# Patient Record
Sex: FEMALE | Race: WHITE | Employment: FULL TIME | URBAN - METROPOLITAN AREA
[De-identification: names, ages, dates, MRNs, and addresses within clinical notes are randomized per-mention and may not be internally consistent; named-entity substitution may affect disease eponyms.]

---

## 2019-09-05 LAB
CANDIDA SPECIES, DNA PROBE: NORMAL
GARDNERELLA VAGINALIS, DNA PROBE: NORMAL
HPV COMMENT: NORMAL
HPV TYPE 16: NOT DETECTED
HPV TYPE 18: NOT DETECTED
HPVOH (OTHER TYPES): NOT DETECTED
TRICHOMONAS VAGINALIS DNA: NORMAL

## 2020-09-03 ENCOUNTER — HOSPITAL ENCOUNTER (EMERGENCY)
Age: 30
Discharge: HOME | End: 2020-09-03
Payer: COMMERCIAL

## 2020-09-03 VITALS
DIASTOLIC BLOOD PRESSURE: 87 MMHG | HEART RATE: 67 BPM | TEMPERATURE: 98 F | OXYGEN SATURATION: 100 % | RESPIRATION RATE: 18 BRPM | SYSTOLIC BLOOD PRESSURE: 134 MMHG | WEIGHT: 168 LBS | BODY MASS INDEX: 25.54 KG/M2

## 2020-09-03 LAB
BACTERIA WET PREP: NORMAL
BILIRUBIN URINE: NEGATIVE
BLOOD, URINE: NEGATIVE
CLARITY: CLEAR
CLUE CELLS: NORMAL
COLOR: YELLOW
EPITHELIAL CELLS WET PREP: NORMAL
GLUCOSE URINE: NEGATIVE MG/DL
HCG(URINE) PREGNANCY TEST: NEGATIVE
KETONES, URINE: NEGATIVE MG/DL
LEUKOCYTE ESTERASE, URINE: NEGATIVE
MICROSCOPIC EXAMINATION: NORMAL
NITRITE, URINE: NEGATIVE
PH UA: 7 (ref 5–8)
PROTEIN UA: NEGATIVE MG/DL
RBC WET PREP: NORMAL
SOURCE WET PREP: NORMAL
SPECIFIC GRAVITY UA: 1.01 (ref 1–1.03)
TRICHOMONAS PREP: NORMAL
URINE REFLEX TO CULTURE: NORMAL
URINE TYPE: NORMAL
UROBILINOGEN, URINE: 0.2 E.U./DL
WBC WET PREP: NORMAL
YEAST WET PREP: NORMAL

## 2020-09-03 PROCEDURE — 99283 EMERGENCY DEPT VISIT LOW MDM: CPT

## 2020-09-03 PROCEDURE — 81003 URINALYSIS AUTO W/O SCOPE: CPT

## 2020-09-03 PROCEDURE — 87491 CHLMYD TRACH DNA AMP PROBE: CPT

## 2020-09-03 PROCEDURE — 87591 N.GONORRHOEAE DNA AMP PROB: CPT

## 2020-09-03 PROCEDURE — 6360000002 HC RX W HCPCS: Performed by: PHYSICIAN ASSISTANT

## 2020-09-03 PROCEDURE — 84703 CHORIONIC GONADOTROPIN ASSAY: CPT

## 2020-09-03 PROCEDURE — 96372 THER/PROPH/DIAG INJ SC/IM: CPT

## 2020-09-03 PROCEDURE — 87210 SMEAR WET MOUNT SALINE/INK: CPT

## 2020-09-03 PROCEDURE — 6370000000 HC RX 637 (ALT 250 FOR IP): Performed by: PHYSICIAN ASSISTANT

## 2020-09-03 PROCEDURE — 2580000003 HC RX 258

## 2020-09-03 RX ORDER — FLUCONAZOLE 150 MG/1
150 TABLET ORAL ONCE
Qty: 1 TABLET | Refills: 0 | Status: SHIPPED | OUTPATIENT
Start: 2020-09-03 | End: 2020-09-03

## 2020-09-03 RX ORDER — FLUCONAZOLE 100 MG/1
200 TABLET ORAL ONCE
Status: COMPLETED | OUTPATIENT
Start: 2020-09-03 | End: 2020-09-03

## 2020-09-03 RX ORDER — CEFTRIAXONE SODIUM 250 MG/1
250 INJECTION, POWDER, FOR SOLUTION INTRAMUSCULAR; INTRAVENOUS ONCE
Status: COMPLETED | OUTPATIENT
Start: 2020-09-03 | End: 2020-09-03

## 2020-09-03 RX ORDER — AZITHROMYCIN 250 MG/1
1000 TABLET, FILM COATED ORAL ONCE
Status: COMPLETED | OUTPATIENT
Start: 2020-09-03 | End: 2020-09-03

## 2020-09-03 RX ADMIN — WATER 10 ML: 1 INJECTION INTRAMUSCULAR; INTRAVENOUS; SUBCUTANEOUS at 18:12

## 2020-09-03 RX ADMIN — CEFTRIAXONE SODIUM 250 MG: 250 INJECTION, POWDER, FOR SOLUTION INTRAMUSCULAR; INTRAVENOUS at 18:12

## 2020-09-03 RX ADMIN — FLUCONAZOLE 200 MG: 100 TABLET ORAL at 18:11

## 2020-09-03 RX ADMIN — AZITHROMYCIN MONOHYDRATE 1000 MG: 250 TABLET ORAL at 18:11

## 2020-09-03 ASSESSMENT — ENCOUNTER SYMPTOMS
BACK PAIN: 0
ABDOMINAL DISTENTION: 0
ABDOMINAL PAIN: 0
WHEEZING: 0
DIARRHEA: 0
SHORTNESS OF BREATH: 0
COUGH: 0
COLOR CHANGE: 0
STRIDOR: 0
VOMITING: 0
CONSTIPATION: 0
NAUSEA: 0

## 2020-09-03 ASSESSMENT — PAIN SCALES - GENERAL: PAINLEVEL_OUTOF10: 4

## 2020-09-03 ASSESSMENT — PAIN DESCRIPTION - LOCATION: LOCATION: PELVIS

## 2020-09-03 NOTE — ED PROVIDER NOTES
[facility]        Pt Name: Jose Alfredo Hawk  MRN: [id number]  [place] 1990  Date of evaluation: 9/3/2020  Provider: Umair Worrell PA-C  PCP: No primary care provider on file. LIVIA. I have evaluated this patient. My supervising physician was available for consultation. CHIEF COMPLAINT       Chief Complaint   Patient presents with    Vaginal Discharge     Pt c/o vaginal irriation. Clear vaginal discharge Reports recently being treated for UTI and yeast infection. HISTORY OF PRESENT ILLNESS   (Location, Timing/Onset, Context/Setting, Quality, Duration, Modifying Factors, Severity, Associated Signs and Symptoms)  Note limiting factors. Jose Alfredo Hawk is a 30 y.o. female who presents to the emergency department with vaginal irritation and discharge for little over 1 week. She just completed a course of Macrobid to treat UTI. After completing the course of Macrobid, she developed a yeast infection and was placed on Diflucan. She did recently have sexual intercourse with male partner without using protection and would like to be tested and treated for STD. Nursing Notes were all reviewed and agreed with or any disagreements were addressed in the HPI. REVIEW OF SYSTEMS    (2-9 systems for level 4, 10 or more for level 5)     Review of Systems   Constitutional: Negative for chills and fever. HENT: Negative. Eyes: Negative for visual disturbance. Respiratory: Negative for cough, shortness of breath, wheezing and stridor. Cardiovascular: Negative for chest pain, palpitations and leg swelling. Gastrointestinal: Negative for abdominal distention, abdominal pain, constipation, diarrhea, nausea and vomiting. Endocrine: Negative for polydipsia, polyphagia and polyuria. Genitourinary: Positive for vaginal discharge and vaginal pain.  Negative for decreased urine volume, difficulty urinating, dysuria, flank pain, frequency, hematuria, pelvic pain, urgency and vaginal bleeding. Musculoskeletal: Negative for back pain, neck pain and neck stiffness. Skin: Negative for color change, pallor, rash and wound. Neurological: Negative for dizziness, tremors, seizures, syncope, facial asymmetry, speech difficulty, weakness, light-headedness, numbness and headaches. Psychiatric/Behavioral: Negative for confusion. All other systems reviewed and are negative. Positives and Pertinent negatives as per HPI. Except as noted above in the ROS, all other systems were reviewed and negative. PAST MEDICAL HISTORY   History reviewed. No pertinent past medical history. SURGICAL HISTORY     Past Surgical History:   Procedure Laterality Date    BREAST SURGERY           CURRENTMEDICATIONS       Previous Medications    ACETAMINOPHEN (TYLENOL) 500 MG TABLET    Take 1,000 mg by mouth every 6 hours as needed. DOCOSAHEXAENOIC ACID (DHA COMPLETE PO)    Take  by mouth daily. PRENATAL VITAMIN (PRENATAL-S) 27-0.8 MG TABS    Take 1 tablet by mouth daily. ALLERGIES     Ciprofloxacin    FAMILYHISTORY       Family History   Problem Relation Age of Onset    High Cholesterol Maternal Grandmother     High Blood Pressure Maternal Grandmother     Heart Disease Maternal Grandfather     Diabetes Paternal Grandfather           SOCIAL HISTORY       Social History     Tobacco Use    Smoking status: Never Smoker    Smokeless tobacco: Never Used   Substance Use Topics    Alcohol use: No    Drug use: Yes     Types: Marijuana       SCREENINGS             PHYSICAL EXAM    (up to 7 for level 4, 8 or more for level 5)     ED Triage Vitals [09/03/20 1656]   BP Temp Temp Source Pulse Resp SpO2 Height Weight   134/87 98 °F (36.7 °C) Temporal 67 18 100 % -- 168 lb (76.2 kg)       Physical Exam  Vitals signs and nursing note reviewed. Constitutional:       Appearance: Normal appearance. She is well-developed. She is not toxic-appearing or diaphoretic. HENT:      Head: Normocephalic and atraumatic. Right Ear: External ear normal.      Left Ear: External ear normal.      Nose: Nose normal.      Mouth/Throat:      Mouth: Mucous membranes are moist.      Pharynx: Oropharynx is clear. Eyes:      General: No scleral icterus. Right eye: No discharge. Left eye: No discharge. Extraocular Movements: Extraocular movements intact. Conjunctiva/sclera: Conjunctivae normal.      Pupils: Pupils are equal, round, and reactive to light. Neck:      Musculoskeletal: Normal range of motion. Cardiovascular:      Rate and Rhythm: Normal rate. Pulmonary:      Effort: Pulmonary effort is normal.      Breath sounds: Normal breath sounds. Abdominal:      General: Bowel sounds are normal. There is no distension. Palpations: Abdomen is soft. Tenderness: There is no abdominal tenderness. There is no right CVA tenderness or left CVA tenderness. Hernia: A hernia is present. There is no hernia in the left inguinal area or right inguinal area. Genitourinary:     General: Normal vulva. Exam position: Supine. Pubic Area: No rash or pubic lice. Labia:         Right: No rash, tenderness, lesion or injury. Left: No rash, tenderness, lesion or injury. Urethra: No prolapse or urethral pain. Vagina: No signs of injury and foreign body. Vaginal discharge (trace amount ) present. No erythema, tenderness, bleeding, lesions or prolapsed vaginal walls. Musculoskeletal: Normal range of motion. Lymphadenopathy:      Lower Body: No right inguinal adenopathy. No left inguinal adenopathy. Skin:     General: Skin is warm and dry. Capillary Refill: Capillary refill takes less than 2 seconds. Coloration: Skin is not jaundiced or pale. Findings: No bruising, erythema, lesion or rash. Neurological:      General: No focal deficit present.       Mental Status: She water injection (has no administration in time range)         This patient presents complaining of vaginal irritation and discharge. There are no focal lesions present. No evidence of other rash, edema, erythema. Abdomen is soft and nontender in all 4 quadrants without pulsatile mass or CVA tenderness. Wet prep is unremarkable. Urinalysis does not suggest infection. Pregnancy test is negative. She wants to be treated for STD. She tolerated medications here well without immediate complications or emesis. Will be sent home with Diflucan since she is prone to getting yeast infections after antibiotics are consumed. She is advised to follow-up closely with gynecologist for recheck and may return to ED per discharge instructions. She was counseled on safe sex release 3 minutes. My suspicion is low for acute surgical abdomen, obstruction, perforation, abscess, mesenteric ischemia, AAA, dissection, cholecystitis, cholangitis, pancreatitis, appendicitis, C. diff colitis, diverticulitis, volvulus, incarcerated hernia, necrotizing fasciitis, TOA, ovarian torsion, PID, ectopic pregnancy, frank mary Greyson syndrome, intussusception, syphilis, herpes, HPV, incarcerated hernia, Kelli gangrene, pyelonephritis, perinephric abscess, kidney stone, urosepsis, fistula or other concerning pathology. We have addressed concerns and expectations . FINAL IMPRESSION      1. Vaginal discharge    2.  Acute vaginitis          DISPOSITION/PLAN   DISPOSITION Decision To Discharge 09/03/2020 06:06:22 PM      PATIENT REFERREDTO:  ROSA Frausto [address]  [address] [place] [address]  [phone]    or see your gynecologist in 1-3 days      DISCHARGE MEDICATIONS:  New Prescriptions    FLUCONAZOLE (DIFLUCAN) 150 MG TABLET    Take 1 tablet by mouth once for 1 dose Take in one week       DISCONTINUED MEDICATIONS:  Discontinued Medications    No medications on file              (Please note that portions of this note were completed with a voice recognition program.  Efforts were made to edit the dictations but occasionally words are mis-transcribed.)    Silvio Schmid PA-C (electronically signed)           Silvio Schmid PA-C  09/03/20 1713

## 2020-09-03 NOTE — ED NOTES
Pt discharged in stable condition, VSS, no signs of distress. Discharge instructions and meds reviewed. Pt verbalizes understanding and states no further questions or concerns unaddressed.        Teodoro Mcadams RN  09/03/20 6718

## 2020-09-03 NOTE — ED NOTES
Bed: 03  Expected date:   Expected time:   Means of arrival:   Comments:  Álvaro Corona 39, RN  09/03/20 6102

## 2020-09-08 LAB
C TRACH DNA GENITAL QL NAA+PROBE: NEGATIVE
N. GONORRHOEAE DNA: NEGATIVE

## 2021-07-15 ENCOUNTER — HOSPITAL ENCOUNTER (EMERGENCY)
Age: 31
Discharge: LEFT BEFORE DISCHARGE | End: 2021-07-15
Payer: COMMERCIAL

## 2021-07-16 ENCOUNTER — HOSPITAL ENCOUNTER (EMERGENCY)
Age: 31
Discharge: HOME | End: 2021-07-16
Payer: COMMERCIAL

## 2021-07-16 ENCOUNTER — APPOINTMENT (OUTPATIENT)
Dept: GENERAL RADIOLOGY | Age: 31
End: 2021-07-16
Payer: COMMERCIAL

## 2021-07-16 VITALS
WEIGHT: 168 LBS | DIASTOLIC BLOOD PRESSURE: 73 MMHG | HEART RATE: 85 BPM | SYSTOLIC BLOOD PRESSURE: 120 MMHG | TEMPERATURE: 98.7 F | HEIGHT: 68 IN | RESPIRATION RATE: 14 BRPM | BODY MASS INDEX: 25.46 KG/M2 | OXYGEN SATURATION: 97 %

## 2021-07-16 DIAGNOSIS — A49.9 BACTERIAL URINARY TRACT INFECTION: ICD-10-CM

## 2021-07-16 DIAGNOSIS — N76.0 BACTERIAL VAGINOSIS: Primary | ICD-10-CM

## 2021-07-16 DIAGNOSIS — N39.0 BACTERIAL URINARY TRACT INFECTION: ICD-10-CM

## 2021-07-16 DIAGNOSIS — J06.9 UPPER RESPIRATORY INFECTION WITH COUGH AND CONGESTION: ICD-10-CM

## 2021-07-16 DIAGNOSIS — B96.89 BACTERIAL VAGINOSIS: Primary | ICD-10-CM

## 2021-07-16 LAB
BACTERIA WET PREP: ABNORMAL
BACTERIA: ABNORMAL /HPF
BILIRUBIN URINE: NEGATIVE
BLOOD, URINE: NEGATIVE
CLARITY: ABNORMAL
CLUE CELLS: ABNORMAL
COLOR: YELLOW
EPITHELIAL CELLS WET PREP: ABNORMAL
EPITHELIAL CELLS, UA: 19 /HPF (ref 0–5)
GLUCOSE URINE: NEGATIVE MG/DL
HYALINE CASTS: 1 /LPF (ref 0–8)
KETONES, URINE: NEGATIVE MG/DL
LEUKOCYTE ESTERASE, URINE: ABNORMAL
MICROSCOPIC EXAMINATION: YES
NITRITE, URINE: NEGATIVE
PH UA: 6.5 (ref 5–8)
PROTEIN UA: NEGATIVE MG/DL
RBC UA: 2 /HPF (ref 0–4)
RBC WET PREP: ABNORMAL
SOURCE WET PREP: ABNORMAL
SPECIFIC GRAVITY UA: 1.02 (ref 1–1.03)
TRICHOMONAS PREP: ABNORMAL
URINE REFLEX TO CULTURE: ABNORMAL
URINE TYPE: ABNORMAL
UROBILINOGEN, URINE: 0.2 E.U./DL
WBC UA: 8 /HPF (ref 0–5)
WBC WET PREP: ABNORMAL
YEAST WET PREP: ABNORMAL

## 2021-07-16 PROCEDURE — 87591 N.GONORRHOEAE DNA AMP PROB: CPT

## 2021-07-16 PROCEDURE — 86695 HERPES SIMPLEX TYPE 1 TEST: CPT

## 2021-07-16 PROCEDURE — 99284 EMERGENCY DEPT VISIT MOD MDM: CPT

## 2021-07-16 PROCEDURE — 86694 HERPES SIMPLEX NES ANTBDY: CPT

## 2021-07-16 PROCEDURE — 86696 HERPES SIMPLEX TYPE 2 TEST: CPT

## 2021-07-16 PROCEDURE — 71046 X-RAY EXAM CHEST 2 VIEWS: CPT

## 2021-07-16 PROCEDURE — 87210 SMEAR WET MOUNT SALINE/INK: CPT

## 2021-07-16 PROCEDURE — 87491 CHLMYD TRACH DNA AMP PROBE: CPT

## 2021-07-16 PROCEDURE — 81001 URINALYSIS AUTO W/SCOPE: CPT

## 2021-07-16 RX ORDER — METRONIDAZOLE 500 MG/1
500 TABLET ORAL 2 TIMES DAILY
Qty: 14 TABLET | Refills: 0 | Status: SHIPPED | OUTPATIENT
Start: 2021-07-16 | End: 2021-07-23

## 2021-07-16 RX ORDER — CEPHALEXIN 500 MG/1
500 CAPSULE ORAL 2 TIMES DAILY
Qty: 10 CAPSULE | Refills: 0 | Status: SHIPPED | OUTPATIENT
Start: 2021-07-16 | End: 2021-07-21

## 2021-07-16 ASSESSMENT — ENCOUNTER SYMPTOMS
SHORTNESS OF BREATH: 1
VOMITING: 0
ABDOMINAL PAIN: 0
CHEST TIGHTNESS: 0
NAUSEA: 0
COUGH: 1
DIARRHEA: 0

## 2021-07-16 NOTE — ED PROVIDER NOTES
[facility]        Pt Name: Karolyn Graff  MRN: [id number]  [place] 1990  Date of evaluation: 7/16/2021  Provider: Kevin Jean PA-C  PCP: No primary care provider on file. Note Started: 9:27 AM EDT       LIVIA. I have evaluated this patient. My supervising physician was available for consultation. CHIEF COMPLAINT       Chief Complaint   Patient presents with    Exposure to STD     Pt to ER for complaint of \"pimples\" in groin, was seen and treated prophylactically for STDs with doxycycline and flagyl, all tests had come back negative.  Congestion     Pt also complains of URI symptoms a little over 1 week, including congestion, cough, denies SOB, CP, fevers. Covid negative 4 days ago. HISTORY OF PRESENT ILLNESS   (Location, Timing/Onset, Context/Setting, Quality, Duration, Modifying Factors, Severity, Associated Signs and Symptoms)  Note limiting factors. Chief Complaint: Possible STD as well as congestion    Karolyn Graff is a 26 y.o. female who presents to the emergency department following a visit that she had recently at the urgent care. Patient had concerns for the possibility of STD. She states that she had had treatment approximately 3 and half weeks ago for yeast vaginitis. She states she took Diflucan in the form of pills. She was still having vaginal discharge and vaginal complaints. She went back to the urgent care because of this and states that she had gonorrhea chlamydia trichomonas and wet prep reperformed. She states that she was offered prophylaxis and did get Rocephin as well as doxycycline and finished its course. She states despite that she is still having pain and occasional vaginal discharge. Patient states she did receive all of the test results thereafter and they were all noted to be negative.   She states she has what she had described as a ulcerated lesion on the left side of her labia a little bit more than a week ago and she is worried to the possibility that this could have been herpes. Patient states the lesion has since gone away. She has no additional lesions and concerns and wants to be tested for herpes. She also goes on to report that she is having upper respiratory symptoms for the past 7 days. She has had congestion as well as mild cough. She did have an evaluation performed and is Covid negative just 4 days ago. Because she is having persistent symptoms related to upper respiratory nasal congestion chest congestion and mild cough she would like this evaluated today as well while she is here in the emergency department. No additional complaints or concerns otherwise noted. Nursing Notes were all reviewed and agreed with or any disagreements were addressed in the HPI. REVIEW OF SYSTEMS    (2-9 systems for level 4, 10 or more for level 5)     Review of Systems   Constitutional: Positive for fever. Negative for activity change and chills. HENT: Positive for congestion. Respiratory: Positive for cough and shortness of breath. Negative for chest tightness. Cardiovascular: Negative for chest pain. Gastrointestinal: Negative for abdominal pain, diarrhea, nausea and vomiting. Genitourinary: Positive for vaginal discharge and vaginal pain. Negative for decreased urine volume, dysuria and flank pain. Skin: Positive for wound. Positives and Pertinent negatives as per HPI. Except as noted above in the ROS, all other systems were reviewed and negative. PAST MEDICAL HISTORY   History reviewed. No pertinent past medical history. SURGICAL HISTORY     Past Surgical History:   Procedure Laterality Date    BREAST SURGERY           CURRENTMEDICATIONS       Previous Medications    ACETAMINOPHEN (TYLENOL) 500 MG TABLET    Take 1,000 mg by mouth every 6 hours as needed. DOCOSAHEXAENOIC ACID (DHA COMPLETE PO)    Take  by mouth daily.       PRENATAL VITAMIN (PRENATAL-S) 27-0.8 MG TABS    Take 1 tablet by mouth daily. ALLERGIES     Ciprofloxacin    FAMILYHISTORY       Family History   Problem Relation Age of Onset    High Cholesterol Maternal Grandmother     High Blood Pressure Maternal Grandmother     Heart Disease Maternal Grandfather     Diabetes Paternal Grandfather           SOCIAL HISTORY       Social History     Tobacco Use    Smoking status: Never Smoker    Smokeless tobacco: Never Used   Substance Use Topics    Alcohol use: Yes     Comment: Socially    Drug use: Yes     Types: Marijuana       SCREENINGS             PHYSICAL EXAM    (up to 7 for level 4, 8 or more for level 5)     ED Triage Vitals [07/16/21 0909]   BP Temp Temp Source Pulse Resp SpO2 Height Weight   120/73 98.7 °F (37.1 °C) Oral 86 18 100 % 5' 7.5\" (1.715 m) 168 lb (76.2 kg)       Physical Exam  Vitals and nursing note reviewed. Constitutional:       General: She is awake. She is not in acute distress. Appearance: Normal appearance. She is well-developed. She is not diaphoretic. HENT:      Head: Normocephalic and atraumatic. No raccoon eyes, Barron's sign, contusion or laceration. Right Ear: Hearing and external ear normal.      Left Ear: Hearing and external ear normal.      Nose: Nose normal. No rhinorrhea. Eyes:      General: No scleral icterus. Right eye: No discharge. Left eye: No discharge. Conjunctiva/sclera: Conjunctivae normal.   Neck:      Vascular: No JVD. Cardiovascular:      Rate and Rhythm: Normal rate and regular rhythm. Heart sounds: No murmur heard. No friction rub. No gallop. Pulmonary:      Effort: Pulmonary effort is normal. No tachypnea, accessory muscle usage or respiratory distress. Breath sounds: Normal breath sounds. No wheezing, rhonchi or rales. Comments: No coughing noted during history and her physical exam taking.   No evidence of conversational dyspnea able to carry on conversation with ease. Abdominal:      General: There is no distension. Palpations: Abdomen is soft. Abdomen is not rigid. There is no mass. Tenderness: There is no abdominal tenderness. There is no guarding or rebound. Musculoskeletal:      Cervical back: Normal range of motion. Skin:     General: Skin is warm and dry. Neurological:      Mental Status: She is alert and oriented to person, place, and time. GCS: GCS eye subscore is 4. GCS verbal subscore is 5. GCS motor subscore is 6. Cranial Nerves: No cranial nerve deficit. Sensory: No sensory deficit. Coordination: Coordination normal.   Psychiatric:         Behavior: Behavior normal. Behavior is cooperative. DIAGNOSTIC RESULTS   LABS:    Labs Reviewed   WET PREP, GENITAL - Abnormal; Notable for the following components:       Result Value    Clue Cells, Wet Prep <1+ (*)     All other components within normal limits    Narrative:     Performed at:  [facility] [address], [facility], [address] [employer]   Phone [phone]   URINE RT REFLEX TO CULTURE - Abnormal; Notable for the following components:    Clarity, UA CLOUDY (*)     Leukocyte Esterase, Urine SMALL (*)     All other components within normal limits    Narrative:     Performed at:  [facility] [address], [facility], [address]   Phone [phone]   MICROSCOPIC URINALYSIS - Abnormal; Notable for the following components:    Bacteria, UA 2+ (*)     WBC, UA 8 (*)     Epithelial Cells, UA 19 (*)     All other components within normal limits    Narrative:     Performed at:  [facility] [address], [facility], [address] [employer]   Phone [phone]   C.TRACHOMATIS N.GONORRHOEAE DNA   HERPES SIMPLEX VIRUS (HSV) I/II ANTIBODIES IGG & IGM W/ REFLEX       When ordered only abnormal lab results are displayed.  All other labs were within normal range or not returned as of this dictation. EKG: When ordered, EKG's are interpreted by the Emergency Department Physician in the absence of a cardiologist.  Please see their note for interpretation of EKG. RADIOLOGY:   Non-plain film images such as CT, Ultrasound and MRI are read by the radiologist. Plain radiographic images are visualized and preliminarily interpreted by the ED Provider with the below findings:        Interpretation per the Radiologist below, if available at the time of this note:    XR CHEST (2 VW)   Final Result   No acute process. PROCEDURES   Unless otherwise noted below, none     Procedures    CRITICAL CARE TIME   N/A    CONSULTS:  None      EMERGENCY DEPARTMENT COURSE and DIFFERENTIAL DIAGNOSIS/MDM:   Vitals:    Vitals:    07/16/21 0909 07/16/21 0914   BP: 120/73 120/73   Pulse: 86 85   Resp: 18 14   Temp: 98.7 °F (37.1 °C) 98.7 °F (37.1 °C)   TempSrc: Oral Oral   SpO2: 100% 97%   Weight: 168 lb (76.2 kg) 168 lb (76.2 kg)   Height: 5' 7.5\" (1.715 m) 5' 7.5\" (1.715 m)       Patient was given the following medications:  Medications - No data to display        The patient's detailed history of present illness is documented as above. Upon arrival to the emergency department the patient's vital signs are as documented. The patient is noted to be hemodynamically stable and afebrile. Physical examination findings are as above. Patient is already been prophylaxed for STDs. I discussed with her reevaluation and her wet prep in light of the above-mentioned. She is adamant that she does not want a herpes simplex virus testing and I have explained that we can do that here in the ED but we will not be able to follow-up with the results and this will need to be done outpatient and she is comfortable with this care plan. Urinalysis demonstrates 8 white blood cells and is significantly contaminated with twice as many skin cells but there is evidence of 2+ bacteria.   In light of associated symptomatology I will treat this. She also has clue cells and therefore start any treatment with Flagyl for bacterial vaginosis. Chest x-ray demonstrates no evidence of acute cardiopulmonary process and she is on a negative Covid test in the last 4 days at this is likely just URI with cough congestion. Follow-up on an outpatient basis strict potential instructions for return a referral onto her primary care provider. The patient has been made aware of the signs and symptoms which would necessitate an immediate return to the emergency department and verbalizes an understanding of these signs and symptoms. I estimate there is low risk for acute appendicitis, bowel obstruction, acute cholecystitis, diverticulitis, incarcerated hernia, pancreatitis, pelvic inflammatory disease, perforated bowel, ulcer, ectopic pregnancy, and or tubo-ovarian abscess thus I consider the discharge disposition reasonable. Also, there is no evidence or peritonitis, sepsis, or toxicity. The patient and/or family and I have discussed the diagnosis and risks, and we agree with discharging home to follow-up with their primary doctor. We also discussed returning to the Emergency Department immediately if new or worsening symptoms occur. We have discussed the symptoms which are most concerning (e.g., severe bleeding, fever, changing or worsening pain, vomiting) that necessitate immediate return      FINAL IMPRESSION      1. Bacterial vaginosis    2. Bacterial urinary tract infection    3.  Upper respiratory infection with cough and congestion          DISPOSITION/PLAN   DISPOSITION: Discharged to home      PATIENT REFERRED TO:  [facility] Emergency Department  [address] [facility]  [phone]    If symptoms worsen      DISCHARGE MEDICATIONS:  New Prescriptions    CEPHALEXIN (KEFLEX) 500 MG CAPSULE    Take 1 capsule by mouth 2 times daily for 5 days    METRONIDAZOLE (FLAGYL) 500 MG TABLET    Take 1 tablet by mouth 2 times daily for 7 days       DISCONTINUED MEDICATIONS:  Discontinued Medications    No medications on file            (Please note that portions of this note were completed with a voice recognition program.  Efforts were made to edit the dictations but occasionally words are mis-transcribed.)    Bob Malloy PA-C (electronically signed)           Corinne Wills PA-C  07/16/21 1046

## 2021-07-16 NOTE — ED NOTES
Pt Discharged in stable condition, VSS, no signs of distress, discharge instructions and meds reviewed. Pt verbalizes understanding and states no further questions or concerns unaddressed.        Ortiz Saunders RN  07/16/21 4808

## 2021-07-17 LAB
C TRACH DNA GENITAL QL NAA+PROBE: NEGATIVE
N. GONORRHOEAE DNA: NEGATIVE

## 2021-07-19 LAB
HERPES TYPE 1/2 IGM COMBINED: 0.8 IV
HERPES TYPE I/II IGG COMBINED: >22.4 IV
HSV 1 GLYCOPROTEIN G AB IGG: 39.7 IV
HSV 2 GLYCOPROTEIN G AB IGG: 0.06 IV

## 2021-07-19 NOTE — ED NOTES
[facility]   Emergency Department Culture Follow-Up       Cristofer Velasquez (CSN: [id number]) was seen and evaluated at [facility] Emergency Department on 7/16/21 by provider  Christiana RICHARD. 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 Treatment Course: The patient was NOT treated in the emergency department with antimicrobial therapy. Recommendation: It is recommend to start acyclovir 400 mg TID for 10 days. This recommendation was reviewed with and agreed by ED provider Ammon RICHARD. Follow-Up:    The patient was contacted and notified of the therapy change. The new prescription was called to [facility] on Date: 7/19/21 by: Ken Ragland PharmD.  Pharmacy Phone number: [phone]    Thank you,    Ken Ragland, PharmD  ED Pharmacist [id number]  7/19/2021    -

## 2021-08-17 ENCOUNTER — OFFICE VISIT (OUTPATIENT)
Dept: FAMILY MEDICINE CLINIC | Age: 31
End: 2021-08-17
Payer: COMMERCIAL

## 2021-08-17 VITALS
TEMPERATURE: 97.5 F | SYSTOLIC BLOOD PRESSURE: 112 MMHG | DIASTOLIC BLOOD PRESSURE: 68 MMHG | BODY MASS INDEX: 26.22 KG/M2 | OXYGEN SATURATION: 99 % | HEART RATE: 88 BPM | HEIGHT: 68 IN | WEIGHT: 173 LBS

## 2021-08-17 DIAGNOSIS — Z00.00 PREVENTATIVE HEALTH CARE: Primary | ICD-10-CM

## 2021-08-17 DIAGNOSIS — Z13.29 SCREENING FOR THYROID DISORDER: ICD-10-CM

## 2021-08-17 DIAGNOSIS — N89.8 VAGINAL DISCHARGE: ICD-10-CM

## 2021-08-17 LAB
BILIRUBIN, POC: NEGATIVE
BLOOD URINE, POC: NEGATIVE
CLARITY, POC: CLEAR
COLOR, POC: YELLOW
GLUCOSE URINE, POC: NEGATIVE
KETONES, POC: NEGATIVE
LEUKOCYTE EST, POC: ABNORMAL
NITRITE, POC: NEGATIVE
PH, POC: 6
PROTEIN, POC: NEGATIVE
SPECIFIC GRAVITY, POC: >=1.03
UROBILINOGEN, POC: ABNORMAL

## 2021-08-17 PROCEDURE — 81002 URINALYSIS NONAUTO W/O SCOPE: CPT | Performed by: NURSE PRACTITIONER

## 2021-08-17 PROCEDURE — 99385 PREV VISIT NEW AGE 18-39: CPT | Performed by: NURSE PRACTITIONER

## 2021-08-17 SDOH — ECONOMIC STABILITY: FOOD INSECURITY: WITHIN THE PAST 12 MONTHS, YOU WORRIED THAT YOUR FOOD WOULD RUN OUT BEFORE YOU GOT MONEY TO BUY MORE.: NEVER TRUE

## 2021-08-17 SDOH — ECONOMIC STABILITY: FOOD INSECURITY: WITHIN THE PAST 12 MONTHS, THE FOOD YOU BOUGHT JUST DIDN'T LAST AND YOU DIDN'T HAVE MONEY TO GET MORE.: NEVER TRUE

## 2021-08-17 ASSESSMENT — PATIENT HEALTH QUESTIONNAIRE - PHQ9
SUM OF ALL RESPONSES TO PHQ QUESTIONS 1-9: 0
2. FEELING DOWN, DEPRESSED OR HOPELESS: 0
SUM OF ALL RESPONSES TO PHQ9 QUESTIONS 1 & 2: 0
1. LITTLE INTEREST OR PLEASURE IN DOING THINGS: 0

## 2021-08-17 ASSESSMENT — SOCIAL DETERMINANTS OF HEALTH (SDOH): HOW HARD IS IT FOR YOU TO PAY FOR THE VERY BASICS LIKE FOOD, HOUSING, MEDICAL CARE, AND HEATING?: NOT HARD AT ALL

## 2021-08-17 NOTE — PROGRESS NOTES
have been marked as taking for the 8/17/21 encounter (Office Visit) with ABHISHEK Ricks NP. History reviewed. No pertinent past medical history. Past Surgical History:   Procedure Laterality Date    BREAST SURGERY       Family History   Problem Relation Age of Onset    High Cholesterol Maternal Grandmother     High Blood Pressure Maternal Grandmother     Heart Disease Maternal Grandfather     Diabetes Paternal Grandfather      Social History     Socioeconomic History    Marital status: Single     Spouse name: Not on file    Number of children: Not on file    Years of education: Not on file    Highest education level: Not on file   Occupational History    Not on file   Tobacco Use    Smoking status: Never Smoker    Smokeless tobacco: Never Used   Substance and Sexual Activity    Alcohol use: Yes     Comment: Socially    Drug use: Yes     Types: Marijuana    Sexual activity: Yes     Partners: Male   Other Topics Concern    Not on file   Social History Narrative    Not on file     Social Determinants of Health     Financial Resource Strain: Low Risk     Difficulty of Paying Living Expenses: Not hard at all   Food Insecurity: No Food Insecurity    Worried About Running Out of Food in the Last Year: Never true    Amira of Food in the Last Year: Never true   Transportation Needs:     Lack of Transportation (Medical):      Lack of Transportation (Non-Medical):    Physical Activity:     Days of Exercise per Week:     Minutes of Exercise per Session:    Stress:     Feeling of Stress :    Social Connections:     Frequency of Communication with Friends and Family:     Frequency of Social Gatherings with Friends and Family:     Attends Denominational Services:     Active Member of Clubs or Organizations:     Attends Club or Organization Meetings:     Marital Status:    Intimate Partner Violence:     Fear of Current or Ex-Partner:     Emotionally Abused:     Physically Abused:     Sexually Abused:        Review of Systems:  A comprehensive review of systems was negative except for what was noted in the HPI. Physical Exam:   Vitals:    08/17/21 1450   BP: 112/68   Site: Right Upper Arm   Position: Sitting   Cuff Size: Medium Adult   Pulse: 88   Temp: 97.5 °F (36.4 °C)   TempSrc: Temporal   SpO2: 99%   Weight: 173 lb (78.5 kg)   Height: 5' 7.5\" (1.715 m)     Body mass index is 26.7 kg/m². Constitutional: She is oriented to person, place, and time. She appears well-developed and well-nourished. No distress. HEENT:   Head: Normocephalic and atraumatic. Right Ear: Tympanic membrane, external ear and ear canal normal.   Left Ear: Tympanic membrane, external ear and ear canal normal.   Nose: Nose normal.   Mouth/Throat: Oropharynx is clear and moist, and mucous membranes are normal.  There is no cervical adenopathy. Eyes: Conjunctivae and extraocular motions are normal. Pupils are equal, round, and reactive to light. Neck: Neck supple. No JVD present. Carotid bruit is not present. No mass and no thyromegaly present. Cardiovascular: Normal rate, regular rhythm, normal heart sounds and intact distal pulses. Exam reveals no gallop and no friction rub. No murmur heard. Pulmonary/Chest: Effort normal and breath sounds normal. No respiratory distress. She has no wheezes, rhonchi or rales. Abdominal: Soft, non-tender. Bowel sounds and aorta are normal. She exhibits no organomegaly, mass or bruit. Breast exam:  not examined. Musculoskeletal: Normal range of motion, no synovitis. She exhibits no edema. Neurological: She is alert and oriented to person, place, and time. She has normal reflexes. No cranial nerve deficit. Coordination normal.   Skin: Skin is warm and dry. There is no rash or erythema. No suspicious lesions noted. Psychiatric: She has a normal mood and affect.  Her speech is normal and behavior is normal. Judgment, cognition and memory are normal. Assessment/Plan:    Goyo Johnston was seen today for new patient. Diagnoses and all orders for this visit:    Preventative health care  -     CBC Auto Differential; Future  -     Comprehensive Metabolic Panel, Fasting; Future  -     Lipid, Fasting; Future  -     Hepatitis C Antibody; Future  -     Lucille Bazzi MD, Gynecology, [facility]    Screening for thyroid disorder  -     TSH with Reflex; Future    Vaginal discharge  -     POCT Urinalysis no Micro  -     Culture, Urine          No current outpatient medications on file prior to visit. No current facility-administered medications on file prior to visit. Allergies   Allergen Reactions    Ciprofloxacin     Cyproterone      History reviewed. No pertinent past medical history. Past Surgical History:   Procedure Laterality Date    BREAST SURGERY        Family History   Problem Relation Age of Onset    High Cholesterol Maternal Grandmother     High Blood Pressure Maternal Grandmother     Heart Disease Maternal Grandfather     Diabetes Paternal Grandfather       Social History     Tobacco Use    Smoking status: Never Smoker    Smokeless tobacco: Never Used   Substance Use Topics    Alcohol use: Yes     Comment: Socially        Review of Systems   Genitourinary: Positive for vaginal discharge. Negative for difficulty urinating, dysuria, vaginal bleeding and vaginal pain.        Objective:    /68 (Site: Right Upper Arm, Position: Sitting, Cuff Size: Medium Adult)   Pulse 88   Temp 97.5 °F (36.4 °C) (Temporal)   Ht 5' 7.5\" (1.715 m)   Wt 173 lb (78.5 kg)   SpO2 99%   BMI 26.70 kg/m²   Weight: 173 lb (78.5 kg)     BP Readings from Last 3 Encounters:   08/17/21 112/68   07/16/21 120/73   09/03/20 134/87     Wt Readings from Last 3 Encounters:   08/17/21 173 lb (78.5 kg)   07/16/21 168 lb (76.2 kg)   09/03/20 168 lb (76.2 kg)     BMI Readings from Last 3 Encounters:   08/17/21 26.70 kg/m²   07/16/21 25.92 kg/m²   09/03/20 25.54 kg/m²       Physical Exam    Assessment/Plan    1. Preventative health care  Advised healthy eating and increased exercise  Advised routine dental and vision  - CBC Auto Differential; Future  - Comprehensive Metabolic Panel, Fasting; Future  - Lipid, Fasting; Future  - Hepatitis C Antibody; Future    2. Screening for thyroid disorder  - TSH with Reflex; Future      Return in about 1 year (around 8/17/2022) for annual check up. This dictation was generated by voice recognition computer software. Although all attempts are made to edit the dictation for accuracy, there may be errors in the transcription that are not intended.

## 2021-08-19 DIAGNOSIS — N30.00 ACUTE CYSTITIS WITHOUT HEMATURIA: Primary | ICD-10-CM

## 2021-08-19 LAB
ORGANISM: ABNORMAL
URINE CULTURE, ROUTINE: ABNORMAL

## 2021-08-19 RX ORDER — SULFAMETHOXAZOLE AND TRIMETHOPRIM 800; 160 MG/1; MG/1
1 TABLET ORAL 2 TIMES DAILY
Qty: 14 TABLET | Refills: 0 | Status: SHIPPED | OUTPATIENT
Start: 2021-08-19